# Patient Record
Sex: FEMALE | Race: WHITE | NOT HISPANIC OR LATINO
[De-identification: names, ages, dates, MRNs, and addresses within clinical notes are randomized per-mention and may not be internally consistent; named-entity substitution may affect disease eponyms.]

---

## 2017-03-13 ENCOUNTER — RX ONLY (RX ONLY)
Age: 52
End: 2017-03-13

## 2017-03-13 RX ORDER — DROSPIRENONE AND ETHINYL ESTRADIOL 0.03MG-3MG
KIT ORAL
Qty: 1 | Refills: 10 | Status: ERX

## 2018-01-09 ENCOUNTER — RX ONLY (RX ONLY)
Age: 53
End: 2018-01-09

## 2018-01-09 RX ORDER — DROSPIRENONE AND ETHINYL ESTRADIOL 0.03MG-3MG
KIT ORAL
Qty: 1 | Refills: 10 | Status: ERX

## 2018-03-02 ENCOUNTER — APPOINTMENT (OUTPATIENT)
Dept: URBAN - METROPOLITAN AREA CLINIC 200 | Age: 53
Setting detail: DERMATOLOGY
End: 2018-03-22

## 2018-03-02 DIAGNOSIS — L70.0 ACNE VULGARIS: ICD-10-CM

## 2018-03-02 PROBLEM — D23.5 OTHER BENIGN NEOPLASM OF SKIN OF TRUNK: Status: ACTIVE | Noted: 2018-03-02

## 2018-03-02 PROCEDURE — OTHER COUNSELING: OTHER

## 2018-03-02 PROCEDURE — 99213 OFFICE O/P EST LOW 20 MIN: CPT

## 2018-03-02 PROCEDURE — OTHER ORDER TESTS: OTHER

## 2018-03-02 ASSESSMENT — SEVERITY ASSESSMENT OVERALL AMONG ALL PATIENTS
IN YOUR EXPERIENCE, AMONG ALL PATIENTS YOU HAVE SEEN WITH THIS CONDITION, HOW SEVERE IS THIS PATIENT'S CONDITION?: NORMAL

## 2018-08-20 ENCOUNTER — RX ONLY (RX ONLY)
Age: 53
End: 2018-08-20

## 2018-08-20 RX ORDER — SPIRONOLACTONE 100 MG/1
TABLET, FILM COATED ORAL
Qty: 60 | Refills: 3 | Status: ERX

## 2019-03-27 ENCOUNTER — RX ONLY (RX ONLY)
Age: 54
End: 2019-03-27

## 2019-03-27 RX ORDER — DROSPIRENONE AND ETHINYL ESTRADIOL 0.03MG-3MG
KIT ORAL
Qty: 1 | Refills: 10 | Status: ERX

## 2019-05-24 ENCOUNTER — APPOINTMENT (OUTPATIENT)
Dept: URBAN - METROPOLITAN AREA CLINIC 200 | Age: 54
Setting detail: DERMATOLOGY
End: 2019-06-10

## 2019-05-24 DIAGNOSIS — L57.8 OTHER SKIN CHANGES DUE TO CHRONIC EXPOSURE TO NONIONIZING RADIATION: ICD-10-CM

## 2019-05-24 PROCEDURE — 99213 OFFICE O/P EST LOW 20 MIN: CPT

## 2019-05-24 PROCEDURE — OTHER COUNSELING: OTHER

## 2019-05-24 ASSESSMENT — LOCATION ZONE DERM: LOCATION ZONE: TRUNK

## 2019-05-24 ASSESSMENT — LOCATION SIMPLE DESCRIPTION DERM: LOCATION SIMPLE: CHEST

## 2019-05-24 ASSESSMENT — LOCATION DETAILED DESCRIPTION DERM: LOCATION DETAILED: LEFT MEDIAL SUPERIOR CHEST

## 2019-09-27 ENCOUNTER — APPOINTMENT (OUTPATIENT)
Dept: URBAN - METROPOLITAN AREA CLINIC 200 | Age: 54
Setting detail: DERMATOLOGY
End: 2019-09-30

## 2019-09-27 DIAGNOSIS — L70.0 ACNE VULGARIS: ICD-10-CM

## 2019-09-27 PROCEDURE — 99212 OFFICE O/P EST SF 10 MIN: CPT

## 2019-09-27 PROCEDURE — OTHER PRESCRIPTION: OTHER

## 2019-09-27 PROCEDURE — OTHER RECOMMENDATIONS: OTHER

## 2019-09-27 PROCEDURE — OTHER PRESCRIPTION MEDICATION MANAGEMENT: OTHER

## 2019-09-27 RX ORDER — AZELAIC ACID 0.15 G/G
GEL TOPICAL
Qty: 1 | Refills: 5 | Status: ERX | COMMUNITY
Start: 2019-09-27

## 2019-09-27 ASSESSMENT — LOCATION SIMPLE DESCRIPTION DERM
LOCATION SIMPLE: LEFT CHEEK
LOCATION SIMPLE: RIGHT CHEEK

## 2019-09-27 ASSESSMENT — LOCATION ZONE DERM: LOCATION ZONE: FACE

## 2019-09-27 ASSESSMENT — LOCATION DETAILED DESCRIPTION DERM
LOCATION DETAILED: LEFT INFERIOR CENTRAL MALAR CHEEK
LOCATION DETAILED: RIGHT INFERIOR CENTRAL MALAR CHEEK

## 2019-09-27 NOTE — PROCEDURE: PRESCRIPTION MEDICATION MANAGEMENT
Plan: T/c getting off spironolactone
Initiate Treatment: azelaic acid 15 % topical gel -apply to face once a day daily\\n\\nNiacinomide 500 mg BID - take one capsule twice a day daily
Detail Level: Detailed
Continue Regimen: Spironolactone
Render In Strict Bullet Format?: No

## 2020-07-17 ENCOUNTER — APPOINTMENT (OUTPATIENT)
Dept: URBAN - METROPOLITAN AREA CLINIC 200 | Age: 55
Setting detail: DERMATOLOGY
End: 2020-07-19

## 2020-07-17 ENCOUNTER — APPOINTMENT (OUTPATIENT)
Dept: URBAN - METROPOLITAN AREA CLINIC 200 | Age: 55
Setting detail: DERMATOLOGY
End: 2020-07-17

## 2020-07-17 DIAGNOSIS — L91.8 OTHER HYPERTROPHIC DISORDERS OF THE SKIN: ICD-10-CM

## 2020-07-17 DIAGNOSIS — L57.8 OTHER SKIN CHANGES DUE TO CHRONIC EXPOSURE TO NONIONIZING RADIATION: ICD-10-CM

## 2020-07-17 PROBLEM — J30.1 ALLERGIC RHINITIS DUE TO POLLEN: Status: ACTIVE | Noted: 2020-07-17

## 2020-07-17 PROCEDURE — 99213 OFFICE O/P EST LOW 20 MIN: CPT

## 2020-07-17 PROCEDURE — OTHER REASSURANCE: OTHER

## 2020-07-17 PROCEDURE — OTHER COUNSELING: OTHER

## 2020-07-17 ASSESSMENT — LOCATION DETAILED DESCRIPTION DERM
LOCATION DETAILED: LEFT SUPERIOR ANTERIOR NECK
LOCATION DETAILED: LEFT SUPERIOR LATERAL FOREHEAD
LOCATION DETAILED: LEFT CLAVICULAR NECK
LOCATION DETAILED: LEFT SUBMANDIBULAR AREA
LOCATION DETAILED: LEFT MEDIAL SUPERIOR CHEST

## 2020-07-17 ASSESSMENT — LOCATION SIMPLE DESCRIPTION DERM
LOCATION SIMPLE: LEFT FOREHEAD
LOCATION SIMPLE: CHEST
LOCATION SIMPLE: LEFT SUBMANDIBULAR AREA
LOCATION SIMPLE: LEFT ANTERIOR NECK

## 2020-07-17 ASSESSMENT — LOCATION ZONE DERM
LOCATION ZONE: TRUNK
LOCATION ZONE: FACE
LOCATION ZONE: NECK

## 2020-07-17 NOTE — PROCEDURE: REASSURANCE
Additional Note: Cautery
Detail Level: Detailed
Hide Additional Notes?: No
Include Location In Plan?: Yes

## 2020-09-09 ENCOUNTER — RX ONLY (RX ONLY)
Age: 55
End: 2020-09-09

## 2020-09-09 RX ORDER — SULFACETAMIDE SODIUM 100 MG/ML
LOTION TOPICAL
Qty: 1 | Refills: 3 | Status: ERX | COMMUNITY
Start: 2020-09-09

## 2021-07-16 ENCOUNTER — APPOINTMENT (OUTPATIENT)
Dept: URBAN - METROPOLITAN AREA CLINIC 200 | Age: 56
Setting detail: DERMATOLOGY
End: 2021-07-19

## 2021-07-16 DIAGNOSIS — L70.0 ACNE VULGARIS: ICD-10-CM

## 2021-07-16 DIAGNOSIS — Z11.52 ENCOUNTER FOR SCREENING FOR COVID-19: ICD-10-CM

## 2021-07-16 DIAGNOSIS — L57.8 OTHER SKIN CHANGES DUE TO CHRONIC EXPOSURE TO NONIONIZING RADIATION: ICD-10-CM

## 2021-07-16 PROBLEM — D23.5 OTHER BENIGN NEOPLASM OF SKIN OF TRUNK: Status: ACTIVE | Noted: 2021-07-16

## 2021-07-16 PROCEDURE — OTHER LAB REPORTS REVIEWED: OTHER

## 2021-07-16 PROCEDURE — OTHER COUNSELING: OTHER

## 2021-07-16 PROCEDURE — OTHER PRESCRIPTION MEDICATION MANAGEMENT: OTHER

## 2021-07-16 PROCEDURE — 99214 OFFICE O/P EST MOD 30 MIN: CPT

## 2021-07-16 PROCEDURE — OTHER SUNSCREEN RECOMMENDATIONS: OTHER

## 2021-07-16 PROCEDURE — OTHER SCREENING FOR COVID-19: OTHER

## 2021-07-16 ASSESSMENT — LOCATION ZONE DERM
LOCATION ZONE: TRUNK
LOCATION ZONE: FACE

## 2021-07-16 ASSESSMENT — LOCATION SIMPLE DESCRIPTION DERM
LOCATION SIMPLE: INFERIOR FOREHEAD
LOCATION SIMPLE: ABDOMEN
LOCATION SIMPLE: CHEST

## 2021-07-16 ASSESSMENT — LOCATION DETAILED DESCRIPTION DERM
LOCATION DETAILED: INFERIOR MID FOREHEAD
LOCATION DETAILED: PERIUMBILICAL SKIN
LOCATION DETAILED: LEFT MEDIAL SUPERIOR CHEST

## 2021-07-16 NOTE — PROCEDURE: COUNSELING
Doxycycline Pregnancy And Lactation Text: This medication is Pregnancy Category D and not consider safe during pregnancy. It is also excreted in breast milk but is considered safe for shorter treatment courses.
Azithromycin Pregnancy And Lactation Text: This medication is considered safe during pregnancy and is also secreted in breast milk.
Dapsone Counseling: I discussed with the patient the risks of dapsone including but not limited to hemolytic anemia, agranulocytosis, rashes, methemoglobinemia, kidney failure, peripheral neuropathy, headaches, GI upset, and liver toxicity.  Patients who start dapsone require monitoring including baseline LFTs and weekly CBCs for the first month, then every month thereafter.  The patient verbalized understanding of the proper use and possible adverse effects of dapsone.  All of the patient's questions and concerns were addressed.
Doxycycline Counseling:  Patient counseled regarding possible photosensitivity and increased risk for sunburn.  Patient instructed to avoid sunlight, if possible.  When exposed to sunlight, patients should wear protective clothing, sunglasses, and sunscreen.  The patient was instructed to call the office immediately if the following severe adverse effects occur:  hearing changes, easy bruising/bleeding, severe headache, or vision changes.  The patient verbalized understanding of the proper use and possible adverse effects of doxycycline.  All of the patient's questions and concerns were addressed.
Topical Retinoid counseling:  Patient advised to apply a pea-sized amount only at bedtime and wait 30 minutes after washing their face before applying.  If too drying, patient may add a non-comedogenic moisturizer. The patient verbalized understanding of the proper use and possible adverse effects of retinoids.  All of the patient's questions and concerns were addressed.
Bactrim Counseling:  I discussed with the patient the risks of sulfa antibiotics including but not limited to GI upset, allergic reaction, drug rash, diarrhea, dizziness, photosensitivity, and yeast infections.  Rarely, more serious reactions can occur including but not limited to aplastic anemia, agranulocytosis, methemoglobinemia, blood dyscrasias, liver or kidney failure, lung infiltrates or desquamative/blistering drug rashes.
Isotretinoin Pregnancy And Lactation Text: This medication is Pregnancy Category X and is considered extremely dangerous during pregnancy. It is unknown if it is excreted in breast milk.
Benzoyl Peroxide Pregnancy And Lactation Text: This medication is Pregnancy Category C. It is unknown if benzoyl peroxide is excreted in breast milk.
Birth Control Pills Pregnancy And Lactation Text: This medication should be avoided if pregnant and for the first 30 days post-partum.
Isotretinoin Counseling: Patient should get monthly blood tests, not donate blood, not drive at night if vision affected, not share medication, and not undergo elective surgery for 6 months after tx completed. Side effects reviewed, pt to contact office should one occur.
High Dose Vitamin A Pregnancy And Lactation Text: High dose vitamin A therapy is contraindicated during pregnancy and breast feeding.
Detail Level: Generalized
Spironolactone Counseling: Patient advised regarding risks of diarrhea, abdominal pain, hyperkalemia, birth defects (for female patients), liver toxicity and renal toxicity. The patient may need blood work to monitor liver and kidney function and potassium levels while on therapy. The patient verbalized understanding of the proper use and possible adverse effects of spironolactone.  All of the patient's questions and concerns were addressed.
Include Pregnancy/Lactation Warning?: No
Sarecycline Pregnancy And Lactation Text: This medication is Pregnancy Category D and not consider safe during pregnancy. It is also excreted in breast milk.
Benzoyl Peroxide Counseling: Patient counseled that medicine may cause skin irritation and bleach clothing.  In the event of skin irritation, the patient was advised to reduce the amount of the drug applied or use it less frequently.   The patient verbalized understanding of the proper use and possible adverse effects of benzoyl peroxide.  All of the patient's questions and concerns were addressed.
Spironolactone Pregnancy And Lactation Text: This medication can cause feminization of the male fetus and should be avoided during pregnancy. The active metabolite is also found in breast milk.
Tetracycline Counseling: Patient counseled regarding possible photosensitivity and increased risk for sunburn.  Patient instructed to avoid sunlight, if possible.  When exposed to sunlight, patients should wear protective clothing, sunglasses, and sunscreen.  The patient was instructed to call the office immediately if the following severe adverse effects occur:  hearing changes, easy bruising/bleeding, severe headache, or vision changes.  The patient verbalized understanding of the proper use and possible adverse effects of tetracycline.  All of the patient's questions and concerns were addressed. Patient understands to avoid pregnancy while on therapy due to potential birth defects.
Birth Control Pills Counseling: Birth Control Pill Counseling: I discussed with the patient the potential side effects of OCPs including but not limited to increased risk of stroke, heart attack, thrombophlebitis, deep venous thrombosis, hepatic adenomas, breast changes, GI upset, headaches, and depression.  The patient verbalized understanding of the proper use and possible adverse effects of OCPs. All of the patient's questions and concerns were addressed.
Tazorac Pregnancy And Lactation Text: This medication is not safe during pregnancy. It is unknown if this medication is excreted in breast milk.
Topical Sulfur Applications Counseling: Topical Sulfur Counseling: Patient counseled that this medication may cause skin irritation or allergic reactions.  In the event of skin irritation, the patient was advised to reduce the amount of the drug applied or use it less frequently.   The patient verbalized understanding of the proper use and possible adverse effects of topical sulfur application.  All of the patient's questions and concerns were addressed.
Sarecycline Counseling: Patient advised regarding possible photosensitivity and discoloration of the teeth, skin, lips, tongue and gums.  Patient instructed to avoid sunlight, if possible.  When exposed to sunlight, patients should wear protective clothing, sunglasses, and sunscreen.  The patient was instructed to call the office immediately if the following severe adverse effects occur:  hearing changes, easy bruising/bleeding, severe headache, or vision changes.  The patient verbalized understanding of the proper use and possible adverse effects of sarecycline.  All of the patient's questions and concerns were addressed.
Bactrim Pregnancy And Lactation Text: This medication is Pregnancy Category D and is known to cause fetal risk.  It is also excreted in breast milk.
Tazorac Counseling:  Patient advised that medication is irritating and drying.  Patient may need to apply sparingly and wash off after an hour before eventually leaving it on overnight.  The patient verbalized understanding of the proper use and possible adverse effects of tazorac.  All of the patient's questions and concerns were addressed.
High Dose Vitamin A Counseling: Side effects reviewed, pt to contact office should one occur.
Azithromycin Counseling:  I discussed with the patient the risks of azithromycin including but not limited to GI upset, allergic reaction, drug rash, diarrhea, and yeast infections.
Dapsone Pregnancy And Lactation Text: This medication is Pregnancy Category C and is not considered safe during pregnancy or breast feeding.
Erythromycin Counseling:  I discussed with the patient the risks of erythromycin including but not limited to GI upset, allergic reaction, drug rash, diarrhea, increase in liver enzymes, and yeast infections.
Detail Level: Simple
Minocycline Counseling: Patient advised regarding possible photosensitivity and discoloration of the teeth, skin, lips, tongue and gums.  Patient instructed to avoid sunlight, if possible.  When exposed to sunlight, patients should wear protective clothing, sunglasses, and sunscreen.  The patient was instructed to call the office immediately if the following severe adverse effects occur:  hearing changes, easy bruising/bleeding, severe headache, or vision changes.  The patient verbalized understanding of the proper use and possible adverse effects of minocycline.  All of the patient's questions and concerns were addressed.
Topical Sulfur Applications Pregnancy And Lactation Text: This medication is Pregnancy Category C and has an unknown safety profile during pregnancy. It is unknown if this topical medication is excreted in breast milk.
Topical Clindamycin Pregnancy And Lactation Text: This medication is Pregnancy Category B and is considered safe during pregnancy. It is unknown if it is excreted in breast milk.
Erythromycin Pregnancy And Lactation Text: This medication is Pregnancy Category B and is considered safe during pregnancy. It is also excreted in breast milk.
Topical Retinoid Pregnancy And Lactation Text: This medication is Pregnancy Category C. It is unknown if this medication is excreted in breast milk.
Topical Clindamycin Counseling: Patient counseled that this medication may cause skin irritation or allergic reactions.  In the event of skin irritation, the patient was advised to reduce the amount of the drug applied or use it less frequently.   The patient verbalized understanding of the proper use and possible adverse effects of clindamycin.  All of the patient's questions and concerns were addressed.

## 2021-07-16 NOTE — PROCEDURE: PRESCRIPTION MEDICATION MANAGEMENT
Initiate Treatment: Spironolactone 100mg bid
Render In Strict Bullet Format?: No
Plan: Schedule an appointment with Dr. Baumann to inquire about Metabolic Syndrome/Metformin
Detail Level: Detailed

## 2021-10-28 ENCOUNTER — RX ONLY (RX ONLY)
Age: 56
End: 2021-10-28

## 2021-10-28 RX ORDER — SPIRONOLACTONE 100 MG/1
TABLET, FILM COATED ORAL
Qty: 60 | Refills: 3 | Status: ERX | COMMUNITY
Start: 2021-10-28

## 2022-01-04 ENCOUNTER — RX ONLY (RX ONLY)
Age: 57
End: 2022-01-04

## 2022-01-04 RX ORDER — SULFACETAMIDE SODIUM 100 MG/ML
LOTION TOPICAL
Qty: 118 | Refills: 3 | Status: ERX | COMMUNITY
Start: 2022-01-04

## 2022-06-16 ENCOUNTER — RX ONLY (RX ONLY)
Age: 57
End: 2022-06-16

## 2022-06-16 RX ORDER — DOXYCYCLINE HYCLATE 20 MG/1
TABLET, FILM COATED ORAL
Qty: 60 | Refills: 4 | Status: CANCELLED
Stop reason: ENTERED-IN-ERROR

## 2022-06-27 ENCOUNTER — RX ONLY (RX ONLY)
Age: 57
End: 2022-06-27

## 2022-06-27 RX ORDER — SPIRONOLACTONE 100 MG/1
TABLET, FILM COATED ORAL
Qty: 60 | Refills: 3 | Status: ERX

## 2022-07-01 ENCOUNTER — APPOINTMENT (OUTPATIENT)
Dept: URBAN - METROPOLITAN AREA CLINIC 203 | Age: 57
Setting detail: DERMATOLOGY
End: 2022-07-03

## 2022-07-01 DIAGNOSIS — L70.0 ACNE VULGARIS: ICD-10-CM

## 2022-07-01 DIAGNOSIS — B35.3 TINEA PEDIS: ICD-10-CM

## 2022-07-01 DIAGNOSIS — L82.1 OTHER SEBORRHEIC KERATOSIS: ICD-10-CM

## 2022-07-01 DIAGNOSIS — L57.8 OTHER SKIN CHANGES DUE TO CHRONIC EXPOSURE TO NONIONIZING RADIATION: ICD-10-CM

## 2022-07-01 DIAGNOSIS — D17 BENIGN LIPOMATOUS NEOPLASM: ICD-10-CM

## 2022-07-01 DIAGNOSIS — Z11.52 ENCOUNTER FOR SCREENING FOR COVID-19: ICD-10-CM

## 2022-07-01 PROBLEM — D17.21 BENIGN LIPOMATOUS NEOPLASM OF SKIN AND SUBCUTANEOUS TISSUE OF RIGHT ARM: Status: ACTIVE | Noted: 2022-07-01

## 2022-07-01 PROCEDURE — OTHER SCREENING FOR COVID-19: OTHER

## 2022-07-01 PROCEDURE — OTHER PRESCRIPTION MEDICATION MANAGEMENT: OTHER

## 2022-07-01 PROCEDURE — OTHER REASSURANCE: OTHER

## 2022-07-01 PROCEDURE — OTHER COUNSELING: OTHER

## 2022-07-01 PROCEDURE — OTHER SUNSCREEN RECOMMENDATIONS: OTHER

## 2022-07-01 PROCEDURE — 99214 OFFICE O/P EST MOD 30 MIN: CPT

## 2022-07-01 PROCEDURE — OTHER PRESCRIPTION: OTHER

## 2022-07-01 RX ORDER — DAPSONE 75 MG/G
GEL TOPICAL
Qty: 60 | Refills: 5 | Status: ERX | COMMUNITY
Start: 2022-07-01

## 2022-07-01 ASSESSMENT — LOCATION ZONE DERM
LOCATION ZONE: FACE
LOCATION ZONE: FEET
LOCATION ZONE: ARM
LOCATION ZONE: TRUNK

## 2022-07-01 ASSESSMENT — LOCATION DETAILED DESCRIPTION DERM
LOCATION DETAILED: RIGHT MEDIAL PLANTAR MIDFOOT
LOCATION DETAILED: LEFT MEDIAL BREAST 10-11:00 REGION
LOCATION DETAILED: LEFT PROXIMAL DORSAL FOREARM
LOCATION DETAILED: LEFT CENTRAL MALAR CHEEK
LOCATION DETAILED: RIGHT CENTRAL MALAR CHEEK
LOCATION DETAILED: RIGHT ANTERIOR SHOULDER
LOCATION DETAILED: LEFT MEDIAL BREAST 11-12:00 REGION
LOCATION DETAILED: LEFT ANTERIOR SHOULDER
LOCATION DETAILED: LEFT MEDIAL PLANTAR MIDFOOT
LOCATION DETAILED: EPIGASTRIC SKIN

## 2022-07-01 ASSESSMENT — LOCATION SIMPLE DESCRIPTION DERM
LOCATION SIMPLE: RIGHT CHEEK
LOCATION SIMPLE: LEFT SHOULDER
LOCATION SIMPLE: LEFT FOREARM
LOCATION SIMPLE: LEFT PLANTAR SURFACE
LOCATION SIMPLE: RIGHT PLANTAR SURFACE
LOCATION SIMPLE: RIGHT SHOULDER
LOCATION SIMPLE: LEFT BREAST
LOCATION SIMPLE: ABDOMEN
LOCATION SIMPLE: LEFT CHEEK

## 2022-07-01 ASSESSMENT — PAIN INTENSITY VAS: HOW INTENSE IS YOUR PAIN 0 BEING NO PAIN, 10 BEING THE MOST SEVERE PAIN POSSIBLE?: NO PAIN

## 2022-07-01 NOTE — PROCEDURE: PRESCRIPTION MEDICATION MANAGEMENT
Initiate Treatment: Niacinomide 500 mg bid\\nAczone
Continue Regimen: Sulfacetamide
Detail Level: Zone
Render In Strict Bullet Format?: No

## 2022-09-09 ENCOUNTER — APPOINTMENT (OUTPATIENT)
Dept: URBAN - METROPOLITAN AREA CLINIC 203 | Age: 57
Setting detail: DERMATOLOGY
End: 2022-09-18

## 2022-09-09 DIAGNOSIS — Z11.52 ENCOUNTER FOR SCREENING FOR COVID-19: ICD-10-CM

## 2022-09-09 DIAGNOSIS — L73.2 HIDRADENITIS SUPPURATIVA: ICD-10-CM

## 2022-09-09 DIAGNOSIS — L71.8 OTHER ROSACEA: ICD-10-CM

## 2022-09-09 PROCEDURE — OTHER ORDER TESTS: OTHER

## 2022-09-09 PROCEDURE — OTHER SCREENING FOR COVID-19: OTHER

## 2022-09-09 PROCEDURE — 99214 OFFICE O/P EST MOD 30 MIN: CPT

## 2022-09-09 PROCEDURE — OTHER PRESCRIPTION: OTHER

## 2022-09-09 PROCEDURE — OTHER PRESCRIPTION MEDICATION MANAGEMENT: OTHER

## 2022-09-09 RX ORDER — IVERMECTIN 3 MG/1
TABLET ORAL
Qty: 6 | Refills: 0 | Status: ERX | COMMUNITY
Start: 2022-09-09

## 2022-09-09 ASSESSMENT — LOCATION DETAILED DESCRIPTION DERM
LOCATION DETAILED: RIGHT AXILLARY VAULT
LOCATION DETAILED: LEFT AXILLARY VAULT
LOCATION DETAILED: RIGHT INFERIOR CENTRAL MALAR CHEEK
LOCATION DETAILED: LEFT CENTRAL MALAR CHEEK
LOCATION DETAILED: EPIGASTRIC SKIN
LOCATION DETAILED: LEFT MEDIAL MALAR CHEEK

## 2022-09-09 ASSESSMENT — LOCATION SIMPLE DESCRIPTION DERM
LOCATION SIMPLE: ABDOMEN
LOCATION SIMPLE: LEFT CHEEK
LOCATION SIMPLE: LEFT AXILLARY VAULT
LOCATION SIMPLE: RIGHT AXILLARY VAULT
LOCATION SIMPLE: RIGHT CHEEK

## 2022-09-09 ASSESSMENT — LOCATION ZONE DERM
LOCATION ZONE: AXILLAE
LOCATION ZONE: FACE
LOCATION ZONE: TRUNK

## 2022-09-09 ASSESSMENT — HURLEY STAGE
IN YOUR EXPERIENCE, AMONG ALL PATIENTS YOU HAVE SEEN WITH THIS CONDITION, HOW SEVERE IS THIS PATIENT'S CONDITION?: HURLEY STAGE I: ABSCESS FORMATION (SINGLE OR MULTIPLE) WITHOUT SINUS TRACTS OR SCARRING

## 2022-09-09 NOTE — PROCEDURE: PRESCRIPTION MEDICATION MANAGEMENT
Render In Strict Bullet Format?: No
Detail Level: Zone
Initiate Treatment: ivermectin\\ntriple cream from [place]
Plan: Has tried Spironolactone and oral contraceptive pill antibiotics cause yeast infections Consider humira

## 2022-09-09 NOTE — PROCEDURE: ORDER TESTS
Bill For Surgical Tray: no
Performing Laboratory: [id number]
Expected Date Of Service: 09/09/2022
Lab Facility: [id number]
Billing Type: Third-Party Bill

## 2022-09-19 ENCOUNTER — RX ONLY (RX ONLY)
Age: 57
End: 2022-09-19

## 2022-09-19 RX ORDER — ADALIMUMAB 80MG/0.8ML
KIT SUBCUTANEOUS
Qty: 1 | Refills: 0 | Status: ERX | COMMUNITY
Start: 2022-09-19

## 2022-10-18 ENCOUNTER — RX ONLY (RX ONLY)
Age: 57
End: 2022-10-18

## 2022-10-18 RX ORDER — SPIRONOLACTONE 100 MG/1
TABLET, FILM COATED ORAL
Qty: 60 | Refills: 3 | Status: ERX | COMMUNITY
Start: 2022-10-18

## 2023-01-13 ENCOUNTER — APPOINTMENT (OUTPATIENT)
Dept: URBAN - METROPOLITAN AREA CLINIC 203 | Age: 58
Setting detail: DERMATOLOGY
End: 2023-01-15

## 2023-01-13 ENCOUNTER — RX ONLY (RX ONLY)
Age: 58
End: 2023-01-13

## 2023-01-13 DIAGNOSIS — I83.9 ASYMPTOMATIC VARICOSE VEINS OF LOWER EXTREMITIES: ICD-10-CM

## 2023-01-13 DIAGNOSIS — L70.0 ACNE VULGARIS: ICD-10-CM

## 2023-01-13 PROBLEM — I83.93 ASYMPTOMATIC VARICOSE VEINS OF BILATERAL LOWER EXTREMITIES: Status: ACTIVE | Noted: 2023-01-13

## 2023-01-13 PROCEDURE — OTHER PRESCRIPTION MEDICATION MANAGEMENT: OTHER

## 2023-01-13 PROCEDURE — 99213 OFFICE O/P EST LOW 20 MIN: CPT

## 2023-01-13 PROCEDURE — OTHER ORDER TESTS: OTHER

## 2023-01-13 PROCEDURE — OTHER COUNSELING: ISOTRETINOIN: OTHER

## 2023-01-13 PROCEDURE — OTHER ISOTRETINOIN INITIATION: OTHER

## 2023-01-13 PROCEDURE — OTHER SCLEROTHERAPY (COSMETIC): OTHER

## 2023-01-13 RX ORDER — ISOTRETINOIN 30 MG/1
CAPSULE, LIQUID FILLED ORAL
Qty: 60 | Refills: 0 | Status: ERX | COMMUNITY
Start: 2023-01-13

## 2023-01-13 ASSESSMENT — LOCATION SIMPLE DESCRIPTION DERM
LOCATION SIMPLE: LEFT CHEEK
LOCATION SIMPLE: RIGHT CALF
LOCATION SIMPLE: SUPERIOR FOREHEAD
LOCATION SIMPLE: LEFT THIGH
LOCATION SIMPLE: LEFT PRETIBIAL REGION
LOCATION SIMPLE: CHIN

## 2023-01-13 ASSESSMENT — LOCATION DETAILED DESCRIPTION DERM
LOCATION DETAILED: LEFT ANTERIOR PROXIMAL THIGH
LOCATION DETAILED: LEFT CENTRAL MALAR CHEEK
LOCATION DETAILED: SUPERIOR MID FOREHEAD
LOCATION DETAILED: RIGHT CHIN
LOCATION DETAILED: LEFT LATERAL DISTAL PRETIBIAL REGION
LOCATION DETAILED: RIGHT DISTAL CALF

## 2023-01-13 ASSESSMENT — LOCATION ZONE DERM
LOCATION ZONE: FACE
LOCATION ZONE: LEG

## 2023-01-13 ASSESSMENT — SEVERITY ASSESSMENT OVERALL AMONG ALL PATIENTS
IN YOUR EXPERIENCE, AMONG ALL PATIENTS YOU HAVE SEEN WITH THIS CONDITION, HOW SEVERE IS THIS PATIENT'S CONDITION?: HIGHLY INFLAMMATORY LESIONS PREDOMINATE; VARIABLE COMEDONES, MANY PAPULES/PUSTULES AND NODULOCYSTIC LESIONS

## 2023-01-13 NOTE — PROCEDURE: ORDER TESTS
Lab Facility: [id number]
Expected Date Of Service: 01/13/2023
Billing Type: Third-Party Bill
Performing Laboratory: [id number]
Bill For Surgical Tray: no

## 2023-01-13 NOTE — PROCEDURE: SCLEROTHERAPY (COSMETIC)
Consent was obtained with risks, benefits, and alternatives discussed for the above procedures.  Photographs were taken.
Detail Level: Detailed
Number Of Injections (Will Not Render If 0): 0
Sclerosant Volume (Cc): 10
Post-Care Instructions: Compression for 3 weeks is critical to optimize your recovery and results. Compliance with compression helps to prevent blood clots and minimizes pain, swelling, bruising, skin discoloration (staining) and the recurrence of vessels.        Materials to gather for your wound care (all available over the counter):        Compression stockings x 2 pairs, 4 x 4 gauze, Comprilan wrap: 8 cm and 10 cm width wrap, Medical adhesive tape.       Compression and Wound care;  Leg compression for 3 weeks is woods to your success and safety. Compression at night is only needed the first day. After that, compression is needed only during waking hours.  However, if your leg feels better with compression at night, then you may continue compression at night as tolerated.       After the sclerotherapy procedure, 2 layers compression will be placed.       1. On the skin, folded or flat gauze will cover the treated areas.       2. A compression wrap (Comprilan) will be wrapped around your leg over the gauze. Once the compression wrap is in place, a compression stocking will be worn. This two layer  compression (wrap plus stocking) should be worn for the first 24 hours if tolerated.       3. After 24 hours, remove all compressions and dressings and just wear the compression stockings only during waking hours.        You will need to wear compression stockings for three weeks after your procedure, unless your physician instructs you otherwise.       Activity:       It is important NOT to be sitting or lying down for several hours after surgery. You may begin walking immediately after surgery. This is good for you, but take it easy.       For 2 days after treatment: Avoid aerobic exercise, weight training, and all other types of exertion that increase your breathing and pulse rates.       Do not get a tan for one month after sclerotherapy. Tanning increases your risk of skin discoloration.      Bathing:       After 24 hours, you may shower or bathe in tepid water, but keep the compression stocking on. Avoid immersion in hot tubs.      For pain or discomfort:       You may take acetaminophen (TylenolTM, Extra-Strength TylenolTM or DatrilTM) as directed.       Do not use aspirin, products containing aspirin, or ibuprofen (for example AdvilTM or MotrinTM) for five days after your surgery, unless approved by your physician.       Dietary restrictions: Do not drink alcoholic beverages for two days after your sclerotherapy procedure.       Possible side effects following sclerotherapy:  After sclerotherapy, mild swelling is expected. The injection sites may also become bruised or gray. You may also experience one or more of the following side effects, which almost always go away within one to four months:       Darkening of the injected veins.       Brownish staining of the skin.       Small clotted vessels under the surface of the skin that you can feel.       Bruising of the injection sites:       What to do about bruising - This will resolve within 2-3 weeks. If you wish the bruising to disappear sooner, then applying Arnicare cream (over the counter, health food stores) will help.       What to do if you feel small clotted vessels under the surface of the skin:       Call us for a follow up appointment. These small clots can be drained through a small nick.       Draining these small clots will help you heal faster and with less discoloration.       Contact your physician if you experience any of the following:       Treated areas become increasingly sore, tender, red or warm.        Acetaminophen does not relieve your discomfort.        Injection sites turn black or the skin around the site breaks down.        Ulceration of the injection sites.       You develop blisters from the tape.       You develop significant swelling or pain in the leg.       Darkening of large areas of the skin or foot.
Price (Use Numbers Only, No Special Characters Or $): 175.00
Disposition: Compression stockings and short stretch elastic bandages were placed postoperatively.

## 2023-01-13 NOTE — PROCEDURE: PRESCRIPTION MEDICATION MANAGEMENT
Initiate Treatment: Isotretinoin 30 mg once daily
Detail Level: Zone
Render In Strict Bullet Format?: No

## 2023-04-19 ENCOUNTER — RX ONLY (RX ONLY)
Age: 58
End: 2023-04-19

## 2023-04-19 RX ORDER — SPIRONOLACTONE 100 MG/1
TABLET, FILM COATED ORAL
Qty: 60 | Refills: 3 | Status: ERX

## 2023-05-04 ENCOUNTER — RX ONLY (RX ONLY)
Age: 58
End: 2023-05-04

## 2023-05-04 RX ORDER — NAFTIFINE HYDROCHLORIDE 10 MG/G
GEL TOPICAL
Qty: 80 | Refills: 3 | Status: ERX | COMMUNITY
Start: 2023-05-04

## 2023-05-04 RX ORDER — KETOCONAZOLE 20 MG/ML
SHAMPOO, SUSPENSION TOPICAL
Qty: 120 | Refills: 3 | Status: ERX | COMMUNITY
Start: 2023-05-04

## 2023-05-05 ENCOUNTER — RX ONLY (RX ONLY)
Age: 58
End: 2023-05-05

## 2023-05-05 RX ORDER — CICLOPIROX 7.7 MG/G
GEL TOPICAL
Qty: 30 | Refills: 4 | Status: ERX | COMMUNITY
Start: 2023-05-05

## 2023-05-06 ENCOUNTER — RX ONLY (RX ONLY)
Age: 58
End: 2023-05-06

## 2023-05-06 RX ORDER — CICLOPIROX OLAMINE 7.7 MG/100ML
SUSPENSION TOPICAL
Qty: 30 | Refills: 0 | Status: ERX | COMMUNITY
Start: 2023-05-05

## 2023-06-20 ENCOUNTER — RX ONLY (RX ONLY)
Age: 58
End: 2023-06-20

## 2023-06-20 RX ORDER — FLUCONAZOLE 100 MG/1
TABLET ORAL
Qty: 6 | Refills: 0 | Status: ERX | COMMUNITY
Start: 2023-06-20

## 2023-07-07 ENCOUNTER — APPOINTMENT (OUTPATIENT)
Dept: URBAN - METROPOLITAN AREA CLINIC 203 | Age: 58
Setting detail: DERMATOLOGY
End: 2023-07-15

## 2023-07-07 DIAGNOSIS — L57.8 OTHER SKIN CHANGES DUE TO CHRONIC EXPOSURE TO NONIONIZING RADIATION: ICD-10-CM

## 2023-07-07 PROCEDURE — OTHER COUNSELING: OTHER

## 2023-07-07 PROCEDURE — OTHER SUNSCREEN RECOMMENDATIONS: OTHER

## 2023-07-07 PROCEDURE — 99213 OFFICE O/P EST LOW 20 MIN: CPT

## 2023-07-07 ASSESSMENT — LOCATION ZONE DERM: LOCATION ZONE: TRUNK

## 2023-07-07 ASSESSMENT — LOCATION DETAILED DESCRIPTION DERM
LOCATION DETAILED: LEFT MEDIAL BREAST 11-12:00 REGION
LOCATION DETAILED: LEFT MEDIAL BREAST 10-11:00 REGION

## 2023-07-07 ASSESSMENT — LOCATION SIMPLE DESCRIPTION DERM: LOCATION SIMPLE: LEFT BREAST

## 2023-07-18 ENCOUNTER — RX ONLY (RX ONLY)
Age: 58
End: 2023-07-18

## 2023-07-18 RX ORDER — IVERMECTIN 3 MG/1
TABLET ORAL
Qty: 4 | Refills: 0 | Status: ERX

## 2023-07-18 RX ORDER — SULFACETAMIDE SODIUM AND SULFUR 10; 5 MG/G; MG/G
RINSE TOPICAL
Qty: 227 | Refills: 8 | Status: ERX | COMMUNITY
Start: 2023-07-17